# Patient Record
Sex: FEMALE | Race: WHITE | NOT HISPANIC OR LATINO
[De-identification: names, ages, dates, MRNs, and addresses within clinical notes are randomized per-mention and may not be internally consistent; named-entity substitution may affect disease eponyms.]

---

## 2019-11-20 ENCOUNTER — APPOINTMENT (OUTPATIENT)
Dept: OPHTHALMOLOGY | Facility: CLINIC | Age: 64
End: 2019-11-20
Payer: COMMERCIAL

## 2019-11-20 ENCOUNTER — NON-APPOINTMENT (OUTPATIENT)
Age: 64
End: 2019-11-20

## 2019-11-20 PROBLEM — Z00.00 ENCOUNTER FOR PREVENTIVE HEALTH EXAMINATION: Status: ACTIVE | Noted: 2019-11-20

## 2019-11-20 PROCEDURE — 92004 COMPRE OPH EXAM NEW PT 1/>: CPT

## 2019-11-27 ENCOUNTER — APPOINTMENT (OUTPATIENT)
Dept: OPHTHALMOLOGY | Facility: CLINIC | Age: 64
End: 2019-11-27

## 2020-02-21 ENCOUNTER — APPOINTMENT (OUTPATIENT)
Dept: GASTROENTEROLOGY | Facility: CLINIC | Age: 65
End: 2020-02-21
Payer: COMMERCIAL

## 2020-02-21 VITALS
HEART RATE: 85 BPM | DIASTOLIC BLOOD PRESSURE: 88 MMHG | HEIGHT: 62 IN | BODY MASS INDEX: 32.02 KG/M2 | OXYGEN SATURATION: 98 % | WEIGHT: 174 LBS | SYSTOLIC BLOOD PRESSURE: 152 MMHG

## 2020-02-21 DIAGNOSIS — Z87.891 PERSONAL HISTORY OF NICOTINE DEPENDENCE: ICD-10-CM

## 2020-02-21 DIAGNOSIS — Z78.9 OTHER SPECIFIED HEALTH STATUS: ICD-10-CM

## 2020-02-21 DIAGNOSIS — K92.1 MELENA: ICD-10-CM

## 2020-02-21 DIAGNOSIS — Z83.79 FAMILY HISTORY OF OTHER DISEASES OF THE DIGESTIVE SYSTEM: ICD-10-CM

## 2020-02-21 PROCEDURE — 99204 OFFICE O/P NEW MOD 45 MIN: CPT

## 2020-02-21 NOTE — PHYSICAL EXAM
[General Appearance - Alert] : alert [Sclera] : the sclera and conjunctiva were normal [General Appearance - In No Acute Distress] : in no acute distress [Extraocular Movements] : extraocular movements were intact [Outer Ear] : the ears and nose were normal in appearance [Neck Appearance] : the appearance of the neck was normal [Hearing Threshold Finger Rub Not [place]] : hearing was normal [Neck Cervical Mass (___cm)] : no neck mass was observed [Heart Rate And Rhythm] : heart rate was normal and rhythm regular [Heart Sounds] : normal S1 and S2 [Auscultation Breath Sounds / Voice Sounds] : lungs were clear to auscultation bilaterally [Heart Sounds Gallop] : no gallops [Murmurs] : no murmurs [Bowel Sounds] : normal bowel sounds [Heart Sounds Pericardial Friction Rub] : no pericardial rub [Abdomen Soft] : soft [Abdomen Tenderness] : non-tender [Abdomen Mass (___ Cm)] : no abdominal mass palpated [Cervical Lymph Nodes Enlarged Anterior Bilaterally] : anterior cervical [Cervical Lymph Nodes Enlarged Posterior Bilaterally] : posterior cervical [Abnormal Walk] : normal gait [Nail Clubbing] : no clubbing  or cyanosis of the fingernails [Supraclavicular Lymph Nodes Enlarged Bilaterally] : supraclavicular [] : no rash [Skin Color & Pigmentation] : normal skin color and pigmentation [Oriented To Time, Place, And Person] : oriented to person, place, and time [Impaired Insight] : insight and judgment were intact [Affect] : the affect was normal

## 2020-02-21 NOTE — ASSESSMENT
[FreeTextEntry1] : 64-year-old woman with history of gallbladder removal in 2015, family hx of ulcerative colitis, who presents with complaints of irregular bowel habits, blood in stools.  She has never had a colonoscopy. \par \par I will therefore plan for a colonoscopy to rule out colitis, colon polyps, colorectal cancer etc. under monitored anesthesia care.  Risks such as perforation requiring surgery, bleeding, infection, diverticulitis, colitis, missed colon cancer (2% to 6%), internal organ injury, etc, risks of bowel prep including colitis, syncope, adverse reaction to medication etc. and risks of anesthesia including cardiopulmonary compromise were discussed with patient.  Patient verbalized understanding and agrees to proceed with the planned procedure.\par \par Since she's had history of long-term acid reflux we discussed the possibility of doing an endoscopy to rule out Chambers's esophagus etc. the patient wants to hold off.\par \par She has been taking long-term omeprazole we discussed the possibility of stopping it and trying alternatives such as H2 blockers - I have recommended Pepcid she will give this a try.\par \par She was recommended to take Metamucil once a day to regulate her stools leading up to her colonoscopy.

## 2020-02-21 NOTE — HISTORY OF PRESENT ILLNESS
[de-identified] : 64-year-old woman with history of gallbladder removal in 2015, who presents with complaints of diarrhea.\par \par The patient reports that her bowel habits have been irregular for many years however it's been getting a little more worse lately.\par \par She says at the end of January, she had abdominal cramping for one day associated with red blood in her stool. She has also seen pinkish blood in her stool.  At times she can see mucus in her stool.\par \par She says on average she has 3 bowel movements a day, her stools can be soft or watery.  At times she feels constipated. The constipation can alternate with the diarrhea.\par \par She denies any other symptoms such as nausea, vomiting.  She denies loss of appetite, abnormal weight loss.  She denies any black blood in her stool.\par \par She says she's been taking omeprazole once a day for 4 years which controls her heartburn. When she stops taking the omeprazole she has heartburn.\par \par She denies any trouble swallowing, pain upon swallowing food.\par \par Her sister was diagnosed with ulcerative colitis. A grandparent may have had liver cancer.  An aunt had pancreatic cancer in her 70s.\par \par She denies having any abdominal pain. The cramping was just at one time event at the end of January.\par \par All other review of systems are negative.  Denies cardiac symptoms.\par

## 2020-08-06 DIAGNOSIS — R19.8 OTHER SPECIFIED SYMPTOMS AND SIGNS INVOLVING THE DIGESTIVE SYSTEM AND ABDOMEN: ICD-10-CM

## 2020-08-12 DIAGNOSIS — Z01.818 ENCOUNTER FOR OTHER PREPROCEDURAL EXAMINATION: ICD-10-CM

## 2020-08-15 ENCOUNTER — APPOINTMENT (OUTPATIENT)
Dept: DISASTER EMERGENCY | Facility: CLINIC | Age: 65
End: 2020-08-15

## 2020-08-16 LAB — SARS-COV-2 N GENE NPH QL NAA+PROBE: NOT DETECTED

## 2020-08-17 ENCOUNTER — TRANSCRIPTION ENCOUNTER (OUTPATIENT)
Age: 65
End: 2020-08-17

## 2020-08-18 ENCOUNTER — RESULT REVIEW (OUTPATIENT)
Age: 65
End: 2020-08-18

## 2020-08-18 ENCOUNTER — APPOINTMENT (OUTPATIENT)
Dept: GASTROENTEROLOGY | Facility: HOSPITAL | Age: 65
End: 2020-08-18

## 2020-08-18 ENCOUNTER — OUTPATIENT (OUTPATIENT)
Dept: OUTPATIENT SERVICES | Facility: HOSPITAL | Age: 65
LOS: 1 days | End: 2020-08-18
Payer: COMMERCIAL

## 2020-08-18 DIAGNOSIS — Z98.89 OTHER SPECIFIED POSTPROCEDURAL STATES: Chronic | ICD-10-CM

## 2020-08-18 DIAGNOSIS — K08.409 PARTIAL LOSS OF TEETH, UNSPECIFIED CAUSE, UNSPECIFIED CLASS: Chronic | ICD-10-CM

## 2020-08-18 DIAGNOSIS — Z12.11 ENCOUNTER FOR SCREENING FOR MALIGNANT NEOPLASM OF COLON: ICD-10-CM

## 2020-08-18 PROCEDURE — 88305 TISSUE EXAM BY PATHOLOGIST: CPT | Mod: 26

## 2020-08-18 PROCEDURE — 45380 COLONOSCOPY AND BIOPSY: CPT

## 2020-08-18 PROCEDURE — 88305 TISSUE EXAM BY PATHOLOGIST: CPT

## 2020-09-08 ENCOUNTER — APPOINTMENT (OUTPATIENT)
Dept: GASTROENTEROLOGY | Facility: CLINIC | Age: 65
End: 2020-09-08
Payer: COMMERCIAL

## 2020-09-08 PROCEDURE — 99449 NTRPROF PH1/NTRNET/EHR 31/>: CPT

## 2020-09-16 ENCOUNTER — NON-APPOINTMENT (OUTPATIENT)
Age: 65
End: 2020-09-16

## 2020-09-16 ENCOUNTER — APPOINTMENT (OUTPATIENT)
Dept: OPHTHALMOLOGY | Facility: CLINIC | Age: 65
End: 2020-09-16
Payer: COMMERCIAL

## 2020-09-16 PROCEDURE — 92014 COMPRE OPH EXAM EST PT 1/>: CPT

## 2020-09-16 PROCEDURE — 92015 DETERMINE REFRACTIVE STATE: CPT

## 2020-09-21 ENCOUNTER — APPOINTMENT (OUTPATIENT)
Dept: GASTROENTEROLOGY | Facility: CLINIC | Age: 65
End: 2020-09-21

## 2020-09-23 ENCOUNTER — LABORATORY RESULT (OUTPATIENT)
Age: 65
End: 2020-09-23

## 2020-09-23 LAB
25(OH)D3 SERPL-MCNC: 32.2 NG/ML
ALBUMIN SERPL ELPH-MCNC: 3.9 G/DL
ALP BLD-CCNC: 100 U/L
ALT SERPL-CCNC: 16 U/L
ANION GAP SERPL CALC-SCNC: 12 MMOL/L
AST SERPL-CCNC: 15 U/L
BASOPHILS # BLD AUTO: 0.04 K/UL
BASOPHILS NFR BLD AUTO: 0.4 %
BILIRUB SERPL-MCNC: 0.5 MG/DL
BUN SERPL-MCNC: 8 MG/DL
CALCIUM SERPL-MCNC: 9 MG/DL
CHLORIDE SERPL-SCNC: 107 MMOL/L
CO2 SERPL-SCNC: 23 MMOL/L
CREAT SERPL-MCNC: 0.9 MG/DL
CRP SERPL-MCNC: 6.62 MG/DL
EOSINOPHIL # BLD AUTO: 0.85 K/UL
EOSINOPHIL NFR BLD AUTO: 8.5 %
FERRITIN SERPL-MCNC: 57 NG/ML
GLUCOSE SERPL-MCNC: 118 MG/DL
HAV IGM SER QL: NONREACTIVE
HBV CORE IGM SER QL: NONREACTIVE
HBV SURFACE AB SER QL: NONREACTIVE
HBV SURFACE AG SER QL: NONREACTIVE
HCT VFR BLD CALC: 43.1 %
HEPATITIS A IGG ANTIBODY: NONREACTIVE
HGB BLD-MCNC: 13 G/DL
IMM GRANULOCYTES NFR BLD AUTO: 0.4 %
IRON SATN MFR SERPL: 7 %
IRON SERPL-MCNC: 19 UG/DL
LYMPHOCYTES # BLD AUTO: 1.65 K/UL
LYMPHOCYTES NFR BLD AUTO: 16.5 %
MAN DIFF?: NORMAL
MCHC RBC-ENTMCNC: 24.3 PG
MCHC RBC-ENTMCNC: 30.2 GM/DL
MCV RBC AUTO: 80.6 FL
MONOCYTES # BLD AUTO: 0.74 K/UL
MONOCYTES NFR BLD AUTO: 7.4 %
NEUTROPHILS # BLD AUTO: 6.68 K/UL
NEUTROPHILS NFR BLD AUTO: 66.8 %
PLATELET # BLD AUTO: 252 K/UL
POTASSIUM SERPL-SCNC: 3.7 MMOL/L
PROT SERPL-MCNC: 7.1 G/DL
RBC # BLD: 5.35 M/UL
RBC # FLD: 18.9 %
SODIUM SERPL-SCNC: 142 MMOL/L
TIBC SERPL-MCNC: 285 UG/DL
UIBC SERPL-MCNC: 266 UG/DL
VIT B12 SERPL-MCNC: 638 PG/ML
WBC # FLD AUTO: 10 K/UL

## 2020-09-24 LAB
C DIFF TOX GENS STL QL NAA+PROBE: NORMAL
CDIFF BY PCR: NOT DETECTED
G LAMBLIA AG STL QL: NORMAL
M TB IFN-G BLD-IMP: NEGATIVE
QUANTIFERON TB PLUS MITOGEN MINUS NIL: 2.23 IU/ML
QUANTIFERON TB PLUS NIL: 0.03 IU/ML
QUANTIFERON TB PLUS TB1 MINUS NIL: 0 IU/ML
QUANTIFERON TB PLUS TB2 MINUS NIL: 0 IU/ML
WRIGHT STN STL: NEGATIVE

## 2020-09-25 LAB
BACTERIA STL CULT: NORMAL
DEPRECATED O AND P PREP STL: NORMAL

## 2020-10-12 ENCOUNTER — APPOINTMENT (OUTPATIENT)
Dept: GASTROENTEROLOGY | Facility: CLINIC | Age: 65
End: 2020-10-12
Payer: MEDICARE

## 2020-10-12 VITALS
HEIGHT: 62 IN | HEART RATE: 54 BPM | SYSTOLIC BLOOD PRESSURE: 127 MMHG | WEIGHT: 156 LBS | OXYGEN SATURATION: 99 % | BODY MASS INDEX: 28.71 KG/M2 | DIASTOLIC BLOOD PRESSURE: 84 MMHG

## 2020-10-12 PROCEDURE — 99213 OFFICE O/P EST LOW 20 MIN: CPT

## 2020-10-12 NOTE — PHYSICAL EXAM
[General Appearance - Alert] : alert [General Appearance - In No Acute Distress] : in no acute distress [Sclera] : the sclera and conjunctiva were normal [Extraocular Movements] : extraocular movements were intact [Outer Ear] : the ears and nose were normal in appearance [Hearing Threshold Finger Rub Not [place]] : hearing was normal [Neck Appearance] : the appearance of the neck was normal [Neck Cervical Mass (___cm)] : no neck mass was observed [Auscultation Breath Sounds / Voice Sounds] : lungs were clear to auscultation bilaterally [Heart Rate And Rhythm] : heart rate was normal and rhythm regular [Heart Sounds] : normal S1 and S2 [Heart Sounds Gallop] : no gallops [Murmurs] : no murmurs [Heart Sounds Pericardial Friction Rub] : no pericardial rub [Bowel Sounds] : normal bowel sounds [Abdomen Soft] : soft [Abdomen Tenderness] : non-tender [Abdomen Mass (___ Cm)] : no abdominal mass palpated [Cervical Lymph Nodes Enlarged Posterior Bilaterally] : posterior cervical [Cervical Lymph Nodes Enlarged Anterior Bilaterally] : anterior cervical [Supraclavicular Lymph Nodes Enlarged Bilaterally] : supraclavicular [Abnormal Walk] : normal gait [Nail Clubbing] : no clubbing  or cyanosis of the fingernails [Skin Color & Pigmentation] : normal skin color and pigmentation [] : no rash [Oriented To Time, Place, And Person] : oriented to person, place, and time [Impaired Insight] : insight and judgment were intact [Affect] : the affect was normal

## 2020-10-12 NOTE — ASSESSMENT
[FreeTextEntry1] : IMPRESSION: \par 1.  Ulcerative Colitis mainly left sided - symptoms improved with oral Budensonide  \par 2.  Stool calprotectin - 1356\par \par \par PLAN\par 1.  Continue Budesonide 9 mg once a day\par 2.  Continue Oral Mesalamine 4 capsules a day\par 3.  Would need to be Up to date on vaccines - hep A and B vaccines\par 4.  Yearly flu shot\par 5.  Follow up in 6 weeks.  \par \par

## 2020-10-12 NOTE — HISTORY OF PRESENT ILLNESS
[de-identified] : 64-year-old woman with history of gallbladder removal in 2015, who present for follow up of ulcerative colitis.  \par \par She has been feeling much better.  Has been having 3 or 4 bowel movements a day.  Mainly stools are loose.  Still wakes up in the morning at 6 to have a bowel movement.   No melena and hematochezia.   \par \par No abdominal pain or abdominal cramps. \par Eats almost three meals a day.  No nausea and vomiting. \par \par Says she feels a new person.  3 weeks ago very bad diarrhea, urgency, and fecal incontinence - now all better since being steroids. \par \par Has not fevers.  \par \par Has been taking Budesonide since past week.  Has been taking antibiotics since past week. On oral Mesalamine.   \par \par \par Stool calprotectin - 1356\par C-d iff negative \par \par \par \par \par Feb 2020 visit: \par The patient reports that her bowel habits have been irregular for many years however it's been getting a little more worse lately.\par \par She says at the end of January, she had abdominal cramping for one day associated with red blood in her stool. She has also seen pinkish blood in her stool. At times she can see mucus in her stool.\par \par She says on average she has 3 bowel movements a day, her stools can be soft or watery. At times she feels constipated. The constipation can alternate with the diarrhea.\par \par She denies any other symptoms such as nausea, vomiting. She denies loss of appetite, abnormal weight loss. She denies any black blood in her stool.\par \par She says she's been taking omeprazole once a day for 4 years which controls her heartburn. When she stops taking the omeprazole she has heartburn.\par \par She denies any trouble swallowing, pain upon swallowing food.\par \par Her sister was diagnosed with ulcerative colitis. A grandparent may have had liver cancer. An aunt had pancreatic cancer in her 70s.\par \par She denies having any abdominal pain. The cramping was just at one time event at the end of January.\par \par \par Colonoscopy in Aug 2020: showed colonic erythema from descending colon and into the rectum - random biopsies were obtained from cecum to rectum.  Cecum bx showed mild colitis with focal cryptitis, ascending colon bx showed mild colitis, transverse colon bx showed mild colitis with mild crypt architectural distortion, descending colon bx showed mild colitis with mild crypt architectural distortion, sigmoid colon bx showed moderately active chronic colitis with focal crypt abscess, and crypt distortion, rectal bx showed moderate to severely active chronic proctitis with chronic changes including cryptitis foci of crypt abscess, surface erosion extensive crypt architectural distortion.  No dysplasia on biopsies.  \par \par \par Sept 2020:\par Reports having lower abdominal cramps. Some days very "bad cramps" - today is a "good day." Some days diarrhea - some days a little bit formed. Had one bowel movement today. Stools are loose mainly. On a bad day has 4 to 7 bowel movements a day - on a good day 3 BMs. Has urgency at times. Wakes up at night, once to have diarrhea. \par \par \par Discussion/Summary Sept 2020: \par Has been having 6 or 7 loose stools a day - no melena, no hematochezia. During the day has no abdominal pain. Had fever of 102 few days ago did not notify PCP, or our office- last night had a temp 100. No fever today. \par \par Has been feeling "tired, drained, loss of appetite."\par \par Her sister was hospitalized twice \par \par Recent CRP of 6.62, recent stool tests negative. \par \par \par Discussion/plan:\par 1. I have recommended her to be on steroids - she would like to hold off on prednisone she is agreeable to Budesonide 9 mg once a day \par 2. Cipro and Flagyl \par 3. Continue mesalamine \par 4. Encouraged hydration - Pedialyte\par 5. Follow up in office. \par 6. Go to ER if persistent fevers, worsening symptoms

## 2020-11-08 ENCOUNTER — RX RENEWAL (OUTPATIENT)
Age: 65
End: 2020-11-08

## 2020-12-03 ENCOUNTER — RX RENEWAL (OUTPATIENT)
Age: 65
End: 2020-12-03

## 2020-12-07 ENCOUNTER — APPOINTMENT (OUTPATIENT)
Dept: GASTROENTEROLOGY | Facility: CLINIC | Age: 65
End: 2020-12-07
Payer: MEDICARE

## 2020-12-07 VITALS
OXYGEN SATURATION: 95 % | DIASTOLIC BLOOD PRESSURE: 95 MMHG | HEIGHT: 62 IN | SYSTOLIC BLOOD PRESSURE: 155 MMHG | WEIGHT: 155 LBS | HEART RATE: 98 BPM | BODY MASS INDEX: 28.52 KG/M2

## 2020-12-07 PROCEDURE — 99072 ADDL SUPL MATRL&STAF TM PHE: CPT

## 2020-12-07 PROCEDURE — 99214 OFFICE O/P EST MOD 30 MIN: CPT

## 2020-12-07 NOTE — PHYSICAL EXAM
[General Appearance - Alert] : alert [General Appearance - In No Acute Distress] : in no acute distress [Sclera] : the sclera and conjunctiva were normal [Extraocular Movements] : extraocular movements were intact [Outer Ear] : the ears and nose were normal in appearance [Hearing Threshold Finger Rub Not [place]] : hearing was normal [Neck Appearance] : the appearance of the neck was normal [Neck Cervical Mass (___cm)] : no neck mass was observed [Auscultation Breath Sounds / Voice Sounds] : lungs were clear to auscultation bilaterally [Heart Rate And Rhythm] : heart rate was normal and rhythm regular [Heart Sounds] : normal S1 and S2 [Heart Sounds Gallop] : no gallops [Murmurs] : no murmurs [Heart Sounds Pericardial Friction Rub] : no pericardial rub [Bowel Sounds] : normal bowel sounds [Abdomen Soft] : soft [Abdomen Tenderness] : non-tender [] : no hepato-splenomegaly [Abdomen Mass (___ Cm)] : no abdominal mass palpated [Cervical Lymph Nodes Enlarged Posterior Bilaterally] : posterior cervical [Cervical Lymph Nodes Enlarged Anterior Bilaterally] : anterior cervical [Supraclavicular Lymph Nodes Enlarged Bilaterally] : supraclavicular [Abnormal Walk] : normal gait [Skin Color & Pigmentation] : normal skin color and pigmentation [Oriented To Time, Place, And Person] : oriented to person, place, and time [Impaired Insight] : insight and judgment were intact [Affect] : the affect was normal

## 2020-12-08 NOTE — ASSESSMENT
[FreeTextEntry1] : IMPRESSION: \par 1.  Ulcerative Colitis mainly left sided - finishing course of steroids - tomorrow last day.  \par 2.  Maintenance therapy - Lialda \par 3.  Stool calprotectin in Sept 2020- 1356 \par \par \par PLAN\par 1.  Blood test and stool test for biomarkers\par 2.  Continue Lialda \par 3.  Add VSL #3\par 4.  Would need to be Up to date on vaccines - hep A and B vaccines\par 5.  Yearly flu shot

## 2020-12-08 NOTE — HISTORY OF PRESENT ILLNESS
[de-identified] : 65-year-old woman with history of gallbladder removal in 2015, who present for follow up of ulcerative colitis on Lialda.  \par \par Feels much better.  \par Has one or two bowel movements a day - 85 to 90% formed.  Still wakes up between 4:30 to 5:30 AM to have a bowel movement since being diagnosed with ulcerative colitis. Tomorrow last day of prednisone nimisha.  \par \par Once in a while she will have abdominal cramps after a bowel movement - twice a week. \par \par No melena and no hematochezia. \par \par No fevers, no chills.  \par \par Still feels weak.  Had oral sores in Oct but then resolves towards end of October.  Denies other extraintestinal manifestation.  \par \par \par \par \par \par \par Initial visit in Feb 2020: \par The patient reports that her bowel habits have been irregular for many years however it's been getting a little more worse lately.\par \par She says at the end of January, she had abdominal cramping for one day associated with red blood in her stool. She has also seen pinkish blood in her stool. At times she can see mucus in her stool.\par \par She says on average she has 3 bowel movements a day, her stools can be soft or watery. At times she feels constipated. The constipation can alternate with the diarrhea.\par \par She denies any other symptoms such as nausea, vomiting. She denies loss of appetite, abnormal weight loss. She denies any black blood in her stool.\par \par She says she's been taking omeprazole once a day for 4 years which controls her heartburn. When she stops taking the omeprazole she has heartburn.\par \par She denies any trouble swallowing, pain upon swallowing food.\par \par Her sister was diagnosed with ulcerative colitis. A grandparent may have had liver cancer. An aunt had pancreatic cancer in her 70s.\par \par She denies having any abdominal pain. The cramping was just at one time event at the end of January.\par \par \par Colonoscopy in Aug 2020: showed colonic erythema from descending colon and into the rectum - random biopsies were obtained from cecum to rectum. Cecum bx showed mild colitis with focal cryptitis, ascending colon bx showed mild colitis, transverse colon bx showed mild colitis with mild crypt architectural distortion, descending colon bx showed mild colitis with mild crypt architectural distortion, sigmoid colon bx showed moderately active chronic colitis with focal crypt abscess, and crypt distortion, rectal bx showed moderate to severely active chronic proctitis with chronic changes including cryptitis foci of crypt abscess, surface erosion extensive crypt architectural distortion. No dysplasia on biopsies. \par \par \par Sept 2020:\par Reports having lower abdominal cramps. Some days very "bad cramps" - today is a "good day." Some days diarrhea - some days a little bit formed. Had one bowel movement today. Stools are loose mainly. On a bad day has 4 to 7 bowel movements a day - on a good day 3 BMs. Has urgency at times. Wakes up at night, once to have diarrhea. \par \par \par Discussion/Summary Sept 2020: \par Has been having 6 or 7 loose stools a day - no melena, no hematochezia. During the day has no abdominal pain. Had fever of 102 few days ago did not notify PCP, or our office- last night had a temp 100. No fever today.  Budesonide started along with antibiotics. \par \par \par  \par \par \par Oct 12, 2020 visit: \par She has been feeling much better. Has been having 3 or 4 bowel movements a day. Mainly stools are loose. Still wakes up in the morning at 6 to have a bowel movement. No melena and hematochezia. \par \par No abdominal pain or abdominal cramps. \par Eats almost three meals a day. No nausea and vomiting. \par \par Says she feels a new person. 3 weeks ago very bad diarrhea, urgency, and fecal incontinence - now all better since being steroids. \par \par Has not fevers. \par \par Has been taking Budesonide since past week. Has been taking antibiotics since past week. On oral Mesalamine. \par \par \par Stool calprotectin - 1356\par C-d iff negative \par \par

## 2020-12-21 ENCOUNTER — RX RENEWAL (OUTPATIENT)
Age: 65
End: 2020-12-21

## 2020-12-29 ENCOUNTER — NON-APPOINTMENT (OUTPATIENT)
Age: 65
End: 2020-12-29

## 2020-12-29 LAB
ALBUMIN SERPL ELPH-MCNC: 4 G/DL
ALP BLD-CCNC: 99 U/L
ALT SERPL-CCNC: 12 U/L
ANION GAP SERPL CALC-SCNC: 9 MMOL/L
AST SERPL-CCNC: 16 U/L
BASOPHILS # BLD AUTO: 0.03 K/UL
BASOPHILS NFR BLD AUTO: 0.4 %
BILIRUB SERPL-MCNC: 0.4 MG/DL
BUN SERPL-MCNC: 4 MG/DL
CALCIUM SERPL-MCNC: 9.3 MG/DL
CHLORIDE SERPL-SCNC: 105 MMOL/L
CO2 SERPL-SCNC: 29 MMOL/L
CREAT SERPL-MCNC: 0.9 MG/DL
CRP SERPL-MCNC: 2.27 MG/DL
EOSINOPHIL # BLD AUTO: 0.19 K/UL
EOSINOPHIL NFR BLD AUTO: 2.3 %
GLUCOSE SERPL-MCNC: 123 MG/DL
HCT VFR BLD CALC: 39.7 %
HGB BLD-MCNC: 12.1 G/DL
IMM GRANULOCYTES NFR BLD AUTO: 0.2 %
LYMPHOCYTES # BLD AUTO: 2.1 K/UL
LYMPHOCYTES NFR BLD AUTO: 25.7 %
MAN DIFF?: NORMAL
MCHC RBC-ENTMCNC: 24.1 PG
MCHC RBC-ENTMCNC: 30.5 GM/DL
MCV RBC AUTO: 78.9 FL
MONOCYTES # BLD AUTO: 0.46 K/UL
MONOCYTES NFR BLD AUTO: 5.6 %
NEUTROPHILS # BLD AUTO: 5.37 K/UL
NEUTROPHILS NFR BLD AUTO: 65.8 %
PLATELET # BLD AUTO: 271 K/UL
POTASSIUM SERPL-SCNC: 3.1 MMOL/L
PROT SERPL-MCNC: 6.9 G/DL
RBC # BLD: 5.03 M/UL
RBC # FLD: 17.4 %
SODIUM SERPL-SCNC: 142 MMOL/L
WBC # FLD AUTO: 8.17 K/UL

## 2021-01-03 ENCOUNTER — RX RENEWAL (OUTPATIENT)
Age: 66
End: 2021-01-03

## 2021-01-03 LAB — CALPROTECTIN FECAL: 403 UG/G

## 2021-01-19 ENCOUNTER — NON-APPOINTMENT (OUTPATIENT)
Age: 66
End: 2021-01-19

## 2021-01-20 ENCOUNTER — APPOINTMENT (OUTPATIENT)
Dept: OPHTHALMOLOGY | Facility: CLINIC | Age: 66
End: 2021-01-20
Payer: MEDICARE

## 2021-01-20 ENCOUNTER — NON-APPOINTMENT (OUTPATIENT)
Age: 66
End: 2021-01-20

## 2021-01-20 PROCEDURE — 92012 INTRM OPH EXAM EST PATIENT: CPT

## 2021-01-20 PROCEDURE — 99072 ADDL SUPL MATRL&STAF TM PHE: CPT

## 2021-01-21 ENCOUNTER — TRANSCRIPTION ENCOUNTER (OUTPATIENT)
Age: 66
End: 2021-01-21

## 2021-01-21 ENCOUNTER — NON-APPOINTMENT (OUTPATIENT)
Age: 66
End: 2021-01-21

## 2021-01-26 ENCOUNTER — APPOINTMENT (OUTPATIENT)
Dept: OPHTHALMOLOGY | Facility: CLINIC | Age: 66
End: 2021-01-26
Payer: MEDICARE

## 2021-01-26 ENCOUNTER — NON-APPOINTMENT (OUTPATIENT)
Age: 66
End: 2021-01-26

## 2021-01-26 PROCEDURE — 92012 INTRM OPH EXAM EST PATIENT: CPT

## 2021-01-26 PROCEDURE — 99072 ADDL SUPL MATRL&STAF TM PHE: CPT

## 2021-01-28 ENCOUNTER — RX RENEWAL (OUTPATIENT)
Age: 66
End: 2021-01-28

## 2021-02-05 ENCOUNTER — APPOINTMENT (OUTPATIENT)
Dept: GASTROENTEROLOGY | Facility: CLINIC | Age: 66
End: 2021-02-05

## 2021-02-09 ENCOUNTER — APPOINTMENT (OUTPATIENT)
Dept: OPHTHALMOLOGY | Facility: CLINIC | Age: 66
End: 2021-02-09
Payer: MEDICARE

## 2021-02-09 ENCOUNTER — NON-APPOINTMENT (OUTPATIENT)
Age: 66
End: 2021-02-09

## 2021-02-09 PROCEDURE — 92012 INTRM OPH EXAM EST PATIENT: CPT

## 2021-02-09 PROCEDURE — 99072 ADDL SUPL MATRL&STAF TM PHE: CPT

## 2021-02-16 ENCOUNTER — RX RENEWAL (OUTPATIENT)
Age: 66
End: 2021-02-16

## 2021-02-19 ENCOUNTER — APPOINTMENT (OUTPATIENT)
Dept: GASTROENTEROLOGY | Facility: CLINIC | Age: 66
End: 2021-02-19
Payer: MEDICARE

## 2021-02-19 VITALS
HEIGHT: 62 IN | WEIGHT: 156.5 LBS | SYSTOLIC BLOOD PRESSURE: 110 MMHG | HEART RATE: 90 BPM | TEMPERATURE: 97 F | OXYGEN SATURATION: 98 % | DIASTOLIC BLOOD PRESSURE: 80 MMHG | BODY MASS INDEX: 28.8 KG/M2

## 2021-02-19 PROCEDURE — 99214 OFFICE O/P EST MOD 30 MIN: CPT

## 2021-02-19 PROCEDURE — 99072 ADDL SUPL MATRL&STAF TM PHE: CPT

## 2021-02-19 RX ORDER — PREDNISOLONE ACETATE 10 MG/ML
1 SUSPENSION/ DROPS OPHTHALMIC
Refills: 0 | Status: ACTIVE | COMMUNITY

## 2021-02-19 RX ORDER — SACCHAROMYCES BOULARDII 50 MG
250 CAPSULE ORAL
Refills: 0 | Status: ACTIVE | COMMUNITY

## 2021-02-19 RX ORDER — VIT C/E/ZN/COPPR/LUTEIN/ZEAXAN 250MG-90MG
CAPSULE ORAL
Refills: 0 | Status: ACTIVE | COMMUNITY

## 2021-02-19 RX ORDER — LACTOBACIL 2/BIFIDO 1/S.THERMO 900B CELL
PACKET (EA) ORAL
Qty: 90 | Refills: 3 | Status: DISCONTINUED | COMMUNITY
Start: 2020-12-07 | End: 2021-02-19

## 2021-02-19 NOTE — PHYSICAL EXAM
[General Appearance - Alert] : alert [General Appearance - In No Acute Distress] : in no acute distress [Sclera] : the sclera and conjunctiva were normal [PERRL With Normal Accommodation] : pupils were equal in size, round, and reactive to light [Extraocular Movements] : extraocular movements were intact [Outer Ear] : the ears and nose were normal in appearance [Oropharynx] : the oropharynx was normal [Neck Appearance] : the appearance of the neck was normal [Neck Cervical Mass (___cm)] : no neck mass was observed [Jugular Venous Distention Increased] : there was no jugular-venous distention [Thyroid Diffuse Enlargement] : the thyroid was not enlarged [Thyroid Nodule] : there were no palpable thyroid nodules [Auscultation Breath Sounds / Voice Sounds] : lungs were clear to auscultation bilaterally [Heart Rate And Rhythm] : heart rate was normal and rhythm regular [Heart Sounds] : normal S1 and S2 [Heart Sounds Gallop] : no gallops [Murmurs] : no murmurs [Heart Sounds Pericardial Friction Rub] : no pericardial rub [Edema] : there was no peripheral edema [Bowel Sounds] : normal bowel sounds [Abdomen Soft] : soft [Abdomen Tenderness] : non-tender [Abdomen Mass (___ Cm)] : no abdominal mass palpated [Cervical Lymph Nodes Enlarged Posterior Bilaterally] : posterior cervical [Cervical Lymph Nodes Enlarged Anterior Bilaterally] : anterior cervical [Supraclavicular Lymph Nodes Enlarged Bilaterally] : supraclavicular [Axillary Lymph Nodes Enlarged Bilaterally] : axillary [Inguinal Lymph Nodes Enlarged Bilaterally] : inguinal [Femoral Lymph Nodes Enlarged Bilaterally] : femoral [No CVA Tenderness] : no ~M costovertebral angle tenderness [No Spinal Tenderness] : no spinal tenderness [Abnormal Walk] : normal gait [Nail Clubbing] : no clubbing  or cyanosis of the fingernails [Musculoskeletal - Swelling] : no joint swelling seen [Motor Tone] : muscle strength and tone were normal [Skin Color & Pigmentation] : normal skin color and pigmentation [Skin Turgor] : normal skin turgor [] : no rash [Oriented To Time, Place, And Person] : oriented to person, place, and time [Impaired Insight] : insight and judgment were intact [Affect] : the affect was normal

## 2021-02-19 NOTE — HISTORY OF PRESENT ILLNESS
[de-identified] : 65-year-old female, recent diagnosis of ulcerative colitis\par Pan ulcerative colitis by biopsies performed at the time of her colonoscopy in August\par In the fall patient developed her first true severe flare of ulcerative colitis as many as 10 bowel movements a day, incontinence, loss of appetite, weight loss of almost 30 pounds\par Patient responded well to prednisone, taper, switch to mesalamine currently using 1.2 g formulation, 4 capsules daily\par Patient feels well at this time\par 2 formed bowel movements a day without bleeding\par Has regained some of the weight\par \par Patient reports that around the time of her colitis diagnosis presented to ophthalmology with red painful eye\par Unsure of exact name of her diagnosis, but currently on corticosteroid drops, markedly improved\par \par Patient denies arthritis complaints, though reports that around the time her colitis flared she experienced oral ulcers, difficulty wearing her dentures\par \par Patient quit smoking 6 years ago\par \par Sister has ulcerative colitis

## 2021-02-19 NOTE — ASSESSMENT
[FreeTextEntry1] : 65-year-old female\par Well-controlled ulcerative colitis now several months following completion of corticosteroid taper\par Improved inflammatory markers C-reactive protein and calprotectin both noted\par \par Plan\par Patient with questions regarding hair loss\par Explained that this is usually due to disease activity, typically improves with disease control\par Also sometimes related to low iron or zinc level\par Patient may take 1 time daily supplement \par if complaints worsen Would have evaluated by dermatology\par \par Patient also asked questions regarding dosing of the mesalamine\par Explained that the longer she goes following corticosteroid taper feeling well, the higher likelihood that the mesalamine is the reason for her continued response\par Recommend that she continue 4 capsules daily for now, with consideration to decreased to 2 or 3 in the spring at the time of her next follow-up\par \par No blood work indicated at this time\par No indication for repeat colonoscopy at this time\par \par Patient referred by Dr. Gabby Coy

## 2021-02-24 ENCOUNTER — RX RENEWAL (OUTPATIENT)
Age: 66
End: 2021-02-24

## 2021-03-16 ENCOUNTER — RX RENEWAL (OUTPATIENT)
Age: 66
End: 2021-03-16

## 2021-03-16 ENCOUNTER — APPOINTMENT (OUTPATIENT)
Dept: OPHTHALMOLOGY | Facility: CLINIC | Age: 66
End: 2021-03-16
Payer: MEDICARE

## 2021-03-16 ENCOUNTER — NON-APPOINTMENT (OUTPATIENT)
Age: 66
End: 2021-03-16

## 2021-03-16 PROCEDURE — 99072 ADDL SUPL MATRL&STAF TM PHE: CPT

## 2021-03-16 PROCEDURE — 92012 INTRM OPH EXAM EST PATIENT: CPT

## 2021-04-15 ENCOUNTER — RX RENEWAL (OUTPATIENT)
Age: 66
End: 2021-04-15

## 2021-05-20 ENCOUNTER — RX RENEWAL (OUTPATIENT)
Age: 66
End: 2021-05-20

## 2021-06-03 ENCOUNTER — APPOINTMENT (OUTPATIENT)
Dept: GASTROENTEROLOGY | Facility: CLINIC | Age: 66
End: 2021-06-03
Payer: MEDICARE

## 2021-06-03 VITALS
TEMPERATURE: 97.1 F | DIASTOLIC BLOOD PRESSURE: 80 MMHG | BODY MASS INDEX: 29.08 KG/M2 | HEIGHT: 62 IN | HEART RATE: 104 BPM | SYSTOLIC BLOOD PRESSURE: 130 MMHG | OXYGEN SATURATION: 96 % | WEIGHT: 158 LBS

## 2021-06-03 PROCEDURE — 99213 OFFICE O/P EST LOW 20 MIN: CPT

## 2021-06-03 NOTE — HISTORY OF PRESENT ILLNESS
[de-identified] : 65-year-old female, newly diagnosed ulcerative colitis\par Off prednisone since December\par Well maintained on mesalamine 4 tablets daily\par Denies any bleeding\par Complaints of gas in the morning\par Occasional loose stools, particularly after lactose-containing foods\par Denies any bleeding abdominal pain or weight loss\par Patient's red eye complaints have improved with steroid eyedrops\par Patient notes that her sister was recently diagnosed with episcleritis in addition to her own history of IBD\par \par Patient received her second Covid vaccination, moderna 2 weeks ago\par Mild fever followed, now resolved

## 2021-06-03 NOTE — ASSESSMENT
[FreeTextEntry1] : 65-year-old female ulcerative colitis, well controlled with mesalamine\par \par Plan\par Continue mesalamine 4 tablets daily\par Office visit 4 months\par Sooner as clinically indicated

## 2021-06-08 ENCOUNTER — APPOINTMENT (OUTPATIENT)
Dept: OPHTHALMOLOGY | Facility: CLINIC | Age: 66
End: 2021-06-08
Payer: MEDICARE

## 2021-06-08 ENCOUNTER — NON-APPOINTMENT (OUTPATIENT)
Age: 66
End: 2021-06-08

## 2021-06-08 PROCEDURE — 92012 INTRM OPH EXAM EST PATIENT: CPT

## 2021-08-03 ENCOUNTER — LABORATORY RESULT (OUTPATIENT)
Age: 66
End: 2021-08-03

## 2021-08-05 LAB
25(OH)D3 SERPL-MCNC: 26.1 NG/ML
ALBUMIN SERPL ELPH-MCNC: 3.8 G/DL
ALP BLD-CCNC: 127 U/L
ALT SERPL-CCNC: 6 U/L
ANION GAP SERPL CALC-SCNC: 14 MMOL/L
AST SERPL-CCNC: 12 U/L
BASOPHILS # BLD AUTO: 0.04 K/UL
BASOPHILS NFR BLD AUTO: 0.4 %
BILIRUB SERPL-MCNC: 0.4 MG/DL
BUN SERPL-MCNC: 6 MG/DL
CALCIUM SERPL-MCNC: 9.2 MG/DL
CHLORIDE SERPL-SCNC: 107 MMOL/L
CO2 SERPL-SCNC: 26 MMOL/L
CREAT SERPL-MCNC: 0.73 MG/DL
CRP SERPL-MCNC: 34 MG/L
EOSINOPHIL # BLD AUTO: 0.42 K/UL
EOSINOPHIL NFR BLD AUTO: 4.3 %
GI PCR PANEL, STOOL: ABNORMAL
GLUCOSE SERPL-MCNC: 119 MG/DL
HCT VFR BLD CALC: 39.9 %
HGB BLD-MCNC: 11.7 G/DL
IMM GRANULOCYTES NFR BLD AUTO: 0.3 %
LYMPHOCYTES # BLD AUTO: 2.41 K/UL
LYMPHOCYTES NFR BLD AUTO: 24.5 %
MAN DIFF?: NORMAL
MCHC RBC-ENTMCNC: 22.1 PG
MCHC RBC-ENTMCNC: 29.3 GM/DL
MCV RBC AUTO: 75.4 FL
MONOCYTES # BLD AUTO: 0.65 K/UL
MONOCYTES NFR BLD AUTO: 6.6 %
NEUTROPHILS # BLD AUTO: 6.29 K/UL
NEUTROPHILS NFR BLD AUTO: 63.9 %
PLATELET # BLD AUTO: 305 K/UL
POTASSIUM SERPL-SCNC: 3.7 MMOL/L
PROT SERPL-MCNC: 7.2 G/DL
RBC # BLD: 5.29 M/UL
RBC # FLD: 20.1 %
SODIUM SERPL-SCNC: 147 MMOL/L
VIT B12 SERPL-MCNC: 462 PG/ML
WBC # FLD AUTO: 9.84 K/UL

## 2021-08-09 LAB
BACTERIA STL CULT: NORMAL
G LAMBLIA AG STL QL: NORMAL

## 2021-08-10 LAB — CALPROTECTIN FECAL: 679 UG/G

## 2021-08-12 ENCOUNTER — NON-APPOINTMENT (OUTPATIENT)
Age: 66
End: 2021-08-12

## 2021-09-07 ENCOUNTER — NON-APPOINTMENT (OUTPATIENT)
Age: 66
End: 2021-09-07

## 2021-09-09 LAB
HBV CORE IGG+IGM SER QL: NONREACTIVE
HBV SURFACE AB SER QL: NONREACTIVE
HBV SURFACE AG SER QL: NONREACTIVE

## 2021-09-10 LAB
M TB IFN-G BLD-IMP: NEGATIVE
QUANTIFERON TB PLUS MITOGEN MINUS NIL: 0.79 IU/ML
QUANTIFERON TB PLUS NIL: 0.02 IU/ML
QUANTIFERON TB PLUS TB1 MINUS NIL: 0 IU/ML
QUANTIFERON TB PLUS TB2 MINUS NIL: 0 IU/ML

## 2021-09-24 ENCOUNTER — APPOINTMENT (OUTPATIENT)
Dept: GASTROENTEROLOGY | Facility: CLINIC | Age: 66
End: 2021-09-24
Payer: MEDICARE

## 2021-09-24 VITALS
WEIGHT: 153 LBS | TEMPERATURE: 98 F | HEIGHT: 62 IN | DIASTOLIC BLOOD PRESSURE: 80 MMHG | HEART RATE: 84 BPM | BODY MASS INDEX: 28.16 KG/M2 | OXYGEN SATURATION: 96 % | SYSTOLIC BLOOD PRESSURE: 125 MMHG

## 2021-09-24 DIAGNOSIS — H20.9 UNSPECIFIED IRIDOCYCLITIS: ICD-10-CM

## 2021-09-24 PROCEDURE — 99214 OFFICE O/P EST MOD 30 MIN: CPT

## 2021-09-24 NOTE — HISTORY OF PRESENT ILLNESS
[de-identified] : 65-year-old female, over 1 year diagnosis, new ulcerative colitis\par Completing second course of prednisone, down to 10 mg daily\par Bowel habit has normalized\par Patient flare associated with GI PCR confirmed E. coli in August\par \par \par Medical history notable also for uveitis

## 2021-09-24 NOTE — ASSESSMENT
[FreeTextEntry1] : Colitis improving with prednisone\par \par Plan\par Continue mesalamine\par Patient's insurance charging $150 a month, offering sulfasalazine as an alternative\par Patient very well informed regarding sulfasalazine, use for colitis, need for blood test monitoring etc.\par For now, patient wishes to continue with the once daily mesalamine formulation, despite the cost\par Patient to continue and complete prednisone taper\par Lengthy discussion with patient regarding options for escalation of therapy to biologic or immune drugs\par Recommended Entyvio specifically, though acknowledged that this would offer no benefit for her uveitis should this flareup again\par If it did, would need to consult as well with her ophthalmologist to coordinate care\par Reviewed other Biologics and immune medications including oral, small molecule formulations\par Patient to do more research, will contact me again if disease flares otherwise 3-month follow-up

## 2021-10-12 ENCOUNTER — NON-APPOINTMENT (OUTPATIENT)
Age: 66
End: 2021-10-12

## 2021-10-12 ENCOUNTER — APPOINTMENT (OUTPATIENT)
Dept: OPHTHALMOLOGY | Facility: CLINIC | Age: 66
End: 2021-10-12
Payer: MEDICARE

## 2021-10-12 PROCEDURE — 92012 INTRM OPH EXAM EST PATIENT: CPT

## 2021-11-15 ENCOUNTER — NON-APPOINTMENT (OUTPATIENT)
Age: 66
End: 2021-11-15

## 2022-01-03 ENCOUNTER — NON-APPOINTMENT (OUTPATIENT)
Age: 67
End: 2022-01-03

## 2022-01-06 ENCOUNTER — APPOINTMENT (OUTPATIENT)
Dept: GASTROENTEROLOGY | Facility: CLINIC | Age: 67
End: 2022-01-06

## 2022-01-10 ENCOUNTER — NON-APPOINTMENT (OUTPATIENT)
Age: 67
End: 2022-01-10

## 2022-01-13 ENCOUNTER — RX RENEWAL (OUTPATIENT)
Age: 67
End: 2022-01-13

## 2022-01-18 ENCOUNTER — NON-APPOINTMENT (OUTPATIENT)
Age: 67
End: 2022-01-18

## 2022-01-25 ENCOUNTER — NON-APPOINTMENT (OUTPATIENT)
Age: 67
End: 2022-01-25

## 2022-01-25 ENCOUNTER — APPOINTMENT (OUTPATIENT)
Dept: GASTROENTEROLOGY | Facility: CLINIC | Age: 67
End: 2022-01-25
Payer: MEDICARE

## 2022-01-25 VITALS
TEMPERATURE: 98.7 F | BODY MASS INDEX: 27.05 KG/M2 | HEART RATE: 102 BPM | OXYGEN SATURATION: 95 % | SYSTOLIC BLOOD PRESSURE: 140 MMHG | DIASTOLIC BLOOD PRESSURE: 78 MMHG | HEIGHT: 62 IN | WEIGHT: 147 LBS

## 2022-01-25 DIAGNOSIS — K21.9 GASTRO-ESOPHAGEAL REFLUX DISEASE W/OUT ESOPHAGITIS: ICD-10-CM

## 2022-01-25 DIAGNOSIS — A09 INFECTIOUS GASTROENTERITIS AND COLITIS, UNSPECIFIED: ICD-10-CM

## 2022-01-25 DIAGNOSIS — K51.90 ULCERATIVE COLITIS, UNSPECIFIED, W/OUT COMPLICATIONS: ICD-10-CM

## 2022-01-25 PROCEDURE — 99213 OFFICE O/P EST LOW 20 MIN: CPT

## 2022-01-25 RX ORDER — MESALAMINE 0.38 G/1
0.38 CAPSULE, EXTENDED RELEASE ORAL DAILY
Qty: 120 | Refills: 5 | Status: ACTIVE | COMMUNITY
Start: 2022-01-25 | End: 1900-01-01

## 2022-01-25 RX ORDER — MESALAMINE 4 G/60ML
4 ENEMA RECTAL
Qty: 2 | Refills: 5 | Status: DISCONTINUED | COMMUNITY
Start: 2021-09-07 | End: 2022-01-25

## 2022-01-25 RX ORDER — MESALAMINE 1000 MG/1
1000 SUPPOSITORY RECTAL
Qty: 1 | Refills: 0 | Status: DISCONTINUED | COMMUNITY
Start: 2021-08-02 | End: 2022-01-25

## 2022-01-25 RX ORDER — PREDNISONE 5 MG/1
5 TABLET ORAL DAILY
Qty: 42 | Refills: 0 | Status: DISCONTINUED | COMMUNITY
Start: 2020-10-26 | End: 2022-01-25

## 2022-01-25 RX ORDER — MESALAMINE 1.2 G/1
1.2 TABLET, DELAYED RELEASE ORAL
Qty: 360 | Refills: 2 | Status: DISCONTINUED | COMMUNITY
Start: 2020-11-05 | End: 2022-01-25

## 2022-01-25 RX ORDER — HYOSCYAMINE SULFATE 0.12 MG/1
0.12 TABLET ORAL
Qty: 360 | Refills: 0 | Status: DISCONTINUED | COMMUNITY
Start: 2022-01-13 | End: 2022-01-25

## 2022-01-25 RX ORDER — DICYCLOMINE HYDROCHLORIDE 10 MG/1
10 CAPSULE ORAL
Qty: 90 | Refills: 0 | Status: ACTIVE | COMMUNITY
Start: 2022-01-25 | End: 1900-01-01

## 2022-01-25 RX ORDER — PREDNISONE 10 MG/1
10 TABLET ORAL
Qty: 60 | Refills: 0 | Status: DISCONTINUED | COMMUNITY
Start: 2021-09-08 | End: 2022-01-25

## 2022-01-25 RX ORDER — AZITHROMYCIN 500 MG/1
500 TABLET, FILM COATED ORAL DAILY
Qty: 1 | Refills: 0 | Status: DISCONTINUED | COMMUNITY
Start: 2021-08-05 | End: 2022-01-25

## 2022-01-25 NOTE — PHYSICAL EXAM
[General Appearance - Alert] : alert [General Appearance - In No Acute Distress] : in no acute distress [General Appearance - Well Nourished] : well nourished [General Appearance - Well Developed] : well developed [Sclera] : the sclera and conjunctiva were normal [Neck Appearance] : the appearance of the neck was normal [] : no respiratory distress [Respiration, Rhythm And Depth] : normal respiratory rhythm and effort [Exaggerated Use Of Accessory Muscles For Inspiration] : no accessory muscle use [Auscultation Breath Sounds / Voice Sounds] : lungs were clear to auscultation bilaterally [Apical Impulse] : the apical impulse was normal [Heart Rate And Rhythm] : heart rate was normal and rhythm regular [Heart Sounds] : normal S1 and S2 [Edema] : there was no peripheral edema [Bowel Sounds] : normal bowel sounds [Abdomen Soft] : soft [Abdomen Tenderness] : non-tender [Abnormal Walk] : normal gait [Skin Color & Pigmentation] : normal skin color and pigmentation [Skin Turgor] : normal skin turgor [Oriented To Time, Place, And Person] : oriented to person, place, and time [Impaired Insight] : insight and judgment were intact [Affect] : the affect was normal [Mood] : the mood was normal

## 2022-01-26 ENCOUNTER — NON-APPOINTMENT (OUTPATIENT)
Age: 67
End: 2022-01-26

## 2022-01-27 NOTE — HISTORY OF PRESENT ILLNESS
[de-identified] : 66-year-old female,with ulcerative colitis diagnosed in 2020 August was an incidental finding with colonoscopy. She than developed severe colitis flare . She was treated with Prednisone taper stared Mesalamine suppository and Mesalamine oral dose.\par She had Uveitis in 2020 was treated with Prednisone eye drops . \par Patient was also treated with oral antibiotics for GI PCR confirmed E. coli in August 2021\par She was given Hyoscyamine for abdominal cramps early this month improved cramps but made her nauseas, she is checking If she can take Dicyclomine, new prescription sent to her pharmacy.\par \par Her colitis symptoms stable with 2-3  bowel movement daily. Denies diarrhea, hematochezia, or mucus in stool. She denies nausea, vomiting, constipation or weight loss.\par \par She got denial letter from her insurance for Lialda, it will only cover Apriso, New prescription for Apriso sent to her pharmacy. \par Acid reflex well controlled on Omeprazole daily. \par Last colonoscopy August 2020.\par  \par

## 2022-01-27 NOTE — ASSESSMENT
[FreeTextEntry1] : 66 year old female with ulcerative colitis complaint with mesalamine orally and her symptoms well controlled. \par Mesalamine prescription from [facility] changed to Apriso as per insurance coverage. \par Dr Sequeira came in answered all her questions, agree with the plan. \par Plan,\par Continue Apriso daily\par Continue Omeprazole \par  Will discuss and order colonoscopy and blood work on her next visit. \par F/U OV in 3-4 months earlier if needed.

## 2022-02-22 ENCOUNTER — APPOINTMENT (OUTPATIENT)
Dept: OPHTHALMOLOGY | Facility: CLINIC | Age: 67
End: 2022-02-22
Payer: MEDICARE

## 2022-02-22 ENCOUNTER — NON-APPOINTMENT (OUTPATIENT)
Age: 67
End: 2022-02-22

## 2022-02-22 PROCEDURE — 92012 INTRM OPH EXAM EST PATIENT: CPT

## 2022-02-22 PROCEDURE — 92025 CPTRIZED CORNEAL TOPOGRAPHY: CPT

## 2022-02-22 PROCEDURE — 92136 OPHTHALMIC BIOMETRY: CPT

## 2022-03-16 ENCOUNTER — NON-APPOINTMENT (OUTPATIENT)
Age: 67
End: 2022-03-16

## 2022-03-16 ENCOUNTER — APPOINTMENT (OUTPATIENT)
Dept: OPHTHALMOLOGY | Facility: CLINIC | Age: 67
End: 2022-03-16
Payer: MEDICARE

## 2022-03-16 PROCEDURE — 92014 COMPRE OPH EXAM EST PT 1/>: CPT

## 2022-05-10 RX ORDER — OMEPRAZOLE 40 MG/1
40 CAPSULE, DELAYED RELEASE ORAL
Qty: 30 | Refills: 5 | Status: ACTIVE | COMMUNITY
Start: 2020-09-22 | End: 1900-01-01

## 2022-05-11 ENCOUNTER — RX RENEWAL (OUTPATIENT)
Age: 67
End: 2022-05-11

## 2022-05-31 ENCOUNTER — RX RENEWAL (OUTPATIENT)
Age: 67
End: 2022-05-31

## 2022-06-06 ENCOUNTER — APPOINTMENT (OUTPATIENT)
Dept: OPHTHALMOLOGY | Facility: AMBULATORY SURGERY CENTER | Age: 67
End: 2022-06-06
Payer: MEDICARE

## 2022-06-06 PROCEDURE — 66982 XCAPSL CTRC RMVL CPLX WO ECP: CPT | Mod: LT

## 2022-06-07 ENCOUNTER — APPOINTMENT (OUTPATIENT)
Dept: OPHTHALMOLOGY | Facility: CLINIC | Age: 67
End: 2022-06-07
Payer: MEDICARE

## 2022-06-07 ENCOUNTER — NON-APPOINTMENT (OUTPATIENT)
Age: 67
End: 2022-06-07

## 2022-06-07 PROCEDURE — 99024 POSTOP FOLLOW-UP VISIT: CPT

## 2022-06-14 ENCOUNTER — NON-APPOINTMENT (OUTPATIENT)
Age: 67
End: 2022-06-14

## 2022-06-14 ENCOUNTER — APPOINTMENT (OUTPATIENT)
Dept: OPHTHALMOLOGY | Facility: CLINIC | Age: 67
End: 2022-06-14
Payer: MEDICARE

## 2022-06-14 PROCEDURE — 99024 POSTOP FOLLOW-UP VISIT: CPT

## 2022-06-28 ENCOUNTER — APPOINTMENT (OUTPATIENT)
Dept: GASTROENTEROLOGY | Facility: CLINIC | Age: 67
End: 2022-06-28

## 2022-07-06 ENCOUNTER — NON-APPOINTMENT (OUTPATIENT)
Age: 67
End: 2022-07-06

## 2022-07-06 ENCOUNTER — APPOINTMENT (OUTPATIENT)
Dept: OPHTHALMOLOGY | Facility: CLINIC | Age: 67
End: 2022-07-06

## 2022-07-06 PROCEDURE — 92134 CPTRZ OPH DX IMG PST SGM RTA: CPT

## 2022-07-06 PROCEDURE — 99024 POSTOP FOLLOW-UP VISIT: CPT

## 2022-08-11 ENCOUNTER — APPOINTMENT (OUTPATIENT)
Dept: GASTROENTEROLOGY | Facility: CLINIC | Age: 67
End: 2022-08-11

## 2022-10-19 ENCOUNTER — RX RENEWAL (OUTPATIENT)
Age: 67
End: 2022-10-19

## 2022-11-09 ENCOUNTER — APPOINTMENT (OUTPATIENT)
Dept: OPHTHALMOLOGY | Facility: CLINIC | Age: 67
End: 2022-11-09

## 2022-11-09 ENCOUNTER — NON-APPOINTMENT (OUTPATIENT)
Age: 67
End: 2022-11-09

## 2022-11-09 PROCEDURE — 92012 INTRM OPH EXAM EST PATIENT: CPT

## 2023-04-25 ENCOUNTER — APPOINTMENT (OUTPATIENT)
Dept: OPHTHALMOLOGY | Facility: CLINIC | Age: 68
End: 2023-04-25
Payer: MEDICARE

## 2023-04-25 ENCOUNTER — NON-APPOINTMENT (OUTPATIENT)
Age: 68
End: 2023-04-25

## 2023-04-25 PROCEDURE — 92134 CPTRZ OPH DX IMG PST SGM RTA: CPT

## 2023-04-25 PROCEDURE — 92012 INTRM OPH EXAM EST PATIENT: CPT

## 2023-06-15 ENCOUNTER — TELEPHONE ENCOUNTER (OUTPATIENT)
Dept: URBAN - METROPOLITAN AREA CLINIC 63 | Facility: CLINIC | Age: 68
End: 2023-06-15

## 2023-07-12 ENCOUNTER — WEB ENCOUNTER (OUTPATIENT)
Dept: URBAN - METROPOLITAN AREA CLINIC 80 | Facility: CLINIC | Age: 68
End: 2023-07-12

## 2023-07-12 ENCOUNTER — OFFICE VISIT (OUTPATIENT)
Dept: URBAN - METROPOLITAN AREA CLINIC 80 | Facility: CLINIC | Age: 68
End: 2023-07-12
Payer: COMMERCIAL

## 2023-07-12 ENCOUNTER — TELEPHONE ENCOUNTER (OUTPATIENT)
Dept: URBAN - METROPOLITAN AREA CLINIC 80 | Facility: CLINIC | Age: 68
End: 2023-07-12

## 2023-07-12 ENCOUNTER — LAB OUTSIDE AN ENCOUNTER (OUTPATIENT)
Dept: URBAN - METROPOLITAN AREA CLINIC 80 | Facility: CLINIC | Age: 68
End: 2023-07-12

## 2023-07-12 VITALS
TEMPERATURE: 97.8 F | BODY MASS INDEX: 24.29 KG/M2 | DIASTOLIC BLOOD PRESSURE: 76 MMHG | SYSTOLIC BLOOD PRESSURE: 124 MMHG | HEART RATE: 109 BPM | HEIGHT: 62 IN | WEIGHT: 132 LBS

## 2023-07-12 DIAGNOSIS — K51.018 ULCERATIVE PANCOLITIS WITH OTHER COMPLICATION: ICD-10-CM

## 2023-07-12 PROBLEM — 444548001: Status: ACTIVE | Noted: 2023-07-12

## 2023-07-12 PROCEDURE — 99204 OFFICE O/P NEW MOD 45 MIN: CPT | Performed by: INTERNAL MEDICINE

## 2023-07-12 RX ORDER — PREDNISONE 20 MG/1
1 TABLET TABLET ORAL ONCE A DAY
Qty: 30 | Refills: 1 | OUTPATIENT
Start: 2023-07-12 | End: 2023-09-10

## 2023-07-12 RX ORDER — MESALAMINE 375 MG/1
4 CAPSULES IN THE MORNING CAPSULE, EXTENDED RELEASE ORAL ONCE A DAY
Status: ACTIVE | COMMUNITY

## 2023-07-12 RX ORDER — HYDROCORTISONE 100 MG/60ML
60 ML IN THE EVENING SUSPENSION RECTAL
Status: ACTIVE | COMMUNITY

## 2023-07-12 RX ORDER — OMEPRAZOLE 20 MG/1
1 CAPSULE 30 MINUTES BEFORE MORNING MEAL CAPSULE, DELAYED RELEASE ORAL ONCE A DAY
Status: ACTIVE | COMMUNITY

## 2023-07-12 NOTE — HPI-TODAY'S VISIT:
She comes in today to establish care after moving from [place].  She has ulcerative colits and currently reports she is not feeling well in general. She is currently taking mesalamine and 2 different enemas daily. She had a colonoscopy in August notable for pancolitis.  She is currently having 4-5 bowel movements per day but she does not see any blood. She does note excess fatigue and low grade fevers.

## 2023-07-13 ENCOUNTER — WEB ENCOUNTER (OUTPATIENT)
Dept: URBAN - METROPOLITAN AREA CLINIC 80 | Facility: CLINIC | Age: 68
End: 2023-07-13

## 2023-07-14 LAB
A/G RATIO: 0.8
ABSOLUTE BASOPHILS: 26
ABSOLUTE EOSINOPHILS: 238
ABSOLUTE LYMPHOCYTES: 1254
ABSOLUTE MONOCYTES: 620
ABSOLUTE NEUTROPHILS: (no result)
ALBUMIN: 3.3
ALKALINE PHOSPHATASE: 105
ALT (SGPT): 7
AST (SGOT): 11
BASOPHILS: 0.2
BILIRUBIN, TOTAL: 0.3
BUN/CREATININE RATIO: (no result)
BUN: 7
C-REACTIVE PROTEIN, QUANT: 109.5
CALCIUM: 8.9
CARBON DIOXIDE, TOTAL: 27
CBC MORPHOLOGY: (no result)
CHLORIDE: 100
CREATININE: 0.76
EGFR: 86
EOSINOPHILS: 1.8
GLOBULIN, TOTAL: 4.4
GLUCOSE: 115
HEMATOCRIT: 35.5
HEMOGLOBIN: 9.9
LYMPHOCYTES: 9.5
MCH: 18
MCHC: 27.9
MCV: 64.5
MONOCYTES: 4.7
MPV: 10.2
NEUTROPHILS: 83.8
PLATELET COUNT: 420
POTASSIUM: 3.7
PROTEIN, TOTAL: 7.7
RDW: 19.7
RED BLOOD CELL COUNT: 5.5
SODIUM: 139
WHITE BLOOD CELL COUNT: 13.2

## 2023-07-27 ENCOUNTER — OFFICE VISIT (OUTPATIENT)
Dept: URBAN - METROPOLITAN AREA SURGERY CENTER 19 | Facility: SURGERY CENTER | Age: 68
End: 2023-07-27
Payer: COMMERCIAL

## 2023-07-27 ENCOUNTER — CLAIMS CREATED FROM THE CLAIM WINDOW (OUTPATIENT)
Dept: URBAN - METROPOLITAN AREA CLINIC 4 | Facility: CLINIC | Age: 68
End: 2023-07-27
Payer: COMMERCIAL

## 2023-07-27 DIAGNOSIS — K63.89 OTHER SPECIFIED DISEASES OF INTESTINE: ICD-10-CM

## 2023-07-27 DIAGNOSIS — K51.00 CHRONIC PANCOLONIC ULCERATIVE COLITIS: ICD-10-CM

## 2023-07-27 DIAGNOSIS — K51.919 ULCERATIVE COLITIS, UNSPECIFIED WITH UNSPECIFIED COMPLICATIONS: ICD-10-CM

## 2023-07-27 PROCEDURE — 45380 COLONOSCOPY AND BIOPSY: CPT | Performed by: INTERNAL MEDICINE

## 2023-07-27 PROCEDURE — 88305 TISSUE EXAM BY PATHOLOGIST: CPT | Performed by: PATHOLOGY

## 2023-07-27 PROCEDURE — G8907 PT DOC NO EVENTS ON DISCHARG: HCPCS | Performed by: INTERNAL MEDICINE

## 2023-07-27 PROCEDURE — 88342 IMHCHEM/IMCYTCHM 1ST ANTB: CPT | Performed by: PATHOLOGY

## 2023-07-27 RX ORDER — PREDNISONE 20 MG/1
1 TABLET TABLET ORAL ONCE A DAY
Qty: 30 | Refills: 1 | Status: ACTIVE | COMMUNITY
Start: 2023-07-12 | End: 2023-09-10

## 2023-07-27 RX ORDER — OMEPRAZOLE 20 MG/1
1 CAPSULE 30 MINUTES BEFORE MORNING MEAL CAPSULE, DELAYED RELEASE ORAL ONCE A DAY
Status: ACTIVE | COMMUNITY

## 2023-07-27 RX ORDER — HYDROCORTISONE 100 MG/60ML
60 ML IN THE EVENING SUSPENSION RECTAL
Status: ACTIVE | COMMUNITY

## 2023-07-27 RX ORDER — MESALAMINE 375 MG/1
4 CAPSULES IN THE MORNING CAPSULE, EXTENDED RELEASE ORAL ONCE A DAY
Status: ACTIVE | COMMUNITY

## 2023-07-31 ENCOUNTER — TELEPHONE ENCOUNTER (OUTPATIENT)
Dept: URBAN - METROPOLITAN AREA CLINIC 80 | Facility: CLINIC | Age: 68
End: 2023-07-31

## 2023-07-31 RX ORDER — PREDNISONE 20 MG/1
1 TABLET TABLET ORAL ONCE A DAY
Qty: 30 | Refills: 1 | OUTPATIENT
Start: 2023-07-31 | End: 2023-09-29

## 2023-08-25 ENCOUNTER — LAB OUTSIDE AN ENCOUNTER (OUTPATIENT)
Dept: URBAN - METROPOLITAN AREA CLINIC 80 | Facility: CLINIC | Age: 68
End: 2023-08-25

## 2023-08-25 ENCOUNTER — OFFICE VISIT (OUTPATIENT)
Dept: URBAN - METROPOLITAN AREA CLINIC 80 | Facility: CLINIC | Age: 68
End: 2023-08-25
Payer: COMMERCIAL

## 2023-08-25 VITALS
TEMPERATURE: 97.3 F | HEART RATE: 89 BPM | WEIGHT: 137.4 LBS | SYSTOLIC BLOOD PRESSURE: 154 MMHG | HEIGHT: 62 IN | DIASTOLIC BLOOD PRESSURE: 82 MMHG | BODY MASS INDEX: 25.28 KG/M2

## 2023-08-25 DIAGNOSIS — K51.018 ULCERATIVE PANCOLITIS WITH OTHER COMPLICATION: ICD-10-CM

## 2023-08-25 PROCEDURE — 99214 OFFICE O/P EST MOD 30 MIN: CPT | Performed by: INTERNAL MEDICINE

## 2023-08-25 RX ORDER — MESALAMINE 375 MG/1
4 CAPSULES IN THE MORNING CAPSULE, EXTENDED RELEASE ORAL ONCE A DAY
Qty: 120 | Refills: 3

## 2023-08-25 RX ORDER — OMEPRAZOLE 20 MG/1
1 CAPSULE 30 MINUTES BEFORE MORNING MEAL CAPSULE, DELAYED RELEASE ORAL ONCE A DAY
Status: ACTIVE | COMMUNITY

## 2023-08-25 RX ORDER — PREDNISONE 20 MG/1
1 TABLET TABLET ORAL ONCE A DAY
Qty: 30 | Refills: 1 | Status: ACTIVE | COMMUNITY
Start: 2023-07-31 | End: 2023-09-29

## 2023-08-25 RX ORDER — PREDNISONE 20 MG/1
1 TABLET TABLET ORAL ONCE A DAY
Qty: 30 | Refills: 1 | Status: ACTIVE | COMMUNITY
Start: 2023-07-12 | End: 2023-09-10

## 2023-08-25 RX ORDER — HYDROCORTISONE 100 MG/60ML
60 ML IN THE EVENING SUSPENSION RECTAL
Status: ACTIVE | COMMUNITY

## 2023-08-25 RX ORDER — MESALAMINE 375 MG/1
4 CAPSULES IN THE MORNING CAPSULE, EXTENDED RELEASE ORAL ONCE A DAY
Status: ACTIVE | COMMUNITY

## 2023-08-25 NOTE — HPI-TODAY'S VISIT:
She comes in today accompanied by family for interval follow up. Her recent colonoscopy was notable for moderate pancolitis. She feels she is doing better overall with the addition of prednisone. We discussed need to increase her therapy today in detail and discussed various treatment options. She does not like needles.

## 2023-08-30 ENCOUNTER — TELEPHONE ENCOUNTER (OUTPATIENT)
Dept: URBAN - METROPOLITAN AREA CLINIC 80 | Facility: CLINIC | Age: 68
End: 2023-08-30

## 2023-09-02 LAB
A/G RATIO: 1.1
ABSOLUTE BASOPHILS: 14
ABSOLUTE EOSINOPHILS: 14
ABSOLUTE LYMPHOCYTES: 986
ABSOLUTE MONOCYTES: 110
ABSOLUTE NEUTROPHILS: (no result)
ALBUMIN: 3.5
ALKALINE PHOSPHATASE: 66
ALT (SGPT): 11
AST (SGOT): 10
BASOPHILS: 0.1
BILIRUBIN, TOTAL: 0.4
BUN/CREATININE RATIO: (no result)
BUN: 10
C-REACTIVE PROTEIN, QUANT: 9
CALCIUM: 9
CARBON DIOXIDE, TOTAL: 29
CBC MORPHOLOGY: (no result)
CHLORIDE: 104
CHOL/HDLC RATIO: 2.7
CHOLESTEROL, TOTAL: 217
CREATININE: 0.83
EGFR: 77
EOSINOPHILS: 0.1
GLOBULIN, TOTAL: 3.3
GLUCOSE: 142
HBSAG SCREEN: (no result)
HDL CHOLESTEROL: 81
HEMATOCRIT: 37.7
HEMOGLOBIN: 10
HEP A AB, IGM: (no result)
HEP B CORE AB, IGM: (no result)
HEPATITIS C ANTIBODY: (no result)
LDL CHOLESTEROL CALC: 108
LYMPHOCYTES: 7.2
MCH: 17.5
MCHC: 26.5
MCV: 65.8
MITOGEN-NIL: 0.61
MONOCYTES: 0.8
MPV: 9.9
NEUTROPHILS: 91.8
NON HDL CHOLESTEROL: 136
PLATELET COUNT: 297
POTASSIUM: 3.3
PROTEIN, TOTAL: 6.8
QUANTIFERON NIL VALUE: 0.05
QUANTIFERON TB1 AG VALUE: 0
QUANTIFERON TB2 AG VALUE: <0
QUANTIFERON-TB GOLD PLUS: NEGATIVE
RDW: 19.4
RED BLOOD CELL COUNT: 5.73
SODIUM: 143
TRIGLYCERIDES: 164
WHITE BLOOD CELL COUNT: 13.7

## 2023-09-05 ENCOUNTER — TELEPHONE ENCOUNTER (OUTPATIENT)
Dept: URBAN - METROPOLITAN AREA CLINIC 80 | Facility: CLINIC | Age: 68
End: 2023-09-05

## 2023-09-05 ENCOUNTER — WEB ENCOUNTER (OUTPATIENT)
Dept: URBAN - METROPOLITAN AREA CLINIC 80 | Facility: CLINIC | Age: 68
End: 2023-09-05

## 2023-09-08 ENCOUNTER — TELEPHONE ENCOUNTER (OUTPATIENT)
Dept: URBAN - METROPOLITAN AREA CLINIC 80 | Facility: CLINIC | Age: 68
End: 2023-09-08

## 2023-09-08 RX ORDER — PREDNISONE 20 MG/1
1 TABLET TABLET ORAL ONCE A DAY
Qty: 30 | Refills: 1
Start: 2023-07-31 | End: 2023-11-07

## 2023-10-04 ENCOUNTER — TELEPHONE ENCOUNTER (OUTPATIENT)
Dept: URBAN - METROPOLITAN AREA CLINIC 80 | Facility: CLINIC | Age: 68
End: 2023-10-04

## 2023-10-04 RX ORDER — MESALAMINE 375 MG/1
4 CAPSULES IN THE MORNING CAPSULE, EXTENDED RELEASE ORAL ONCE A DAY
Qty: 120 | Refills: 3
End: 2024-02-01

## 2023-10-04 RX ORDER — PREDNISONE 20 MG/1
1 TABLET TABLET ORAL ONCE A DAY
Qty: 30 | Refills: 1
Start: 2023-07-31 | End: 2023-12-03

## 2023-12-01 ENCOUNTER — LAB OUTSIDE AN ENCOUNTER (OUTPATIENT)
Dept: URBAN - METROPOLITAN AREA CLINIC 80 | Facility: CLINIC | Age: 68
End: 2023-12-01

## 2023-12-01 ENCOUNTER — OFFICE VISIT (OUTPATIENT)
Dept: URBAN - METROPOLITAN AREA CLINIC 80 | Facility: CLINIC | Age: 68
End: 2023-12-01
Payer: COMMERCIAL

## 2023-12-01 VITALS
BODY MASS INDEX: 25.76 KG/M2 | WEIGHT: 140 LBS | SYSTOLIC BLOOD PRESSURE: 170 MMHG | TEMPERATURE: 98 F | DIASTOLIC BLOOD PRESSURE: 95 MMHG | HEART RATE: 95 BPM | HEIGHT: 62 IN

## 2023-12-01 DIAGNOSIS — K51.018 ULCERATIVE PANCOLITIS WITH OTHER COMPLICATION: ICD-10-CM

## 2023-12-01 PROCEDURE — 99214 OFFICE O/P EST MOD 30 MIN: CPT | Performed by: INTERNAL MEDICINE

## 2023-12-01 RX ORDER — MESALAMINE 375 MG/1
4 CAPSULES IN THE MORNING CAPSULE, EXTENDED RELEASE ORAL ONCE A DAY
Qty: 120 | Refills: 3 | Status: ACTIVE | COMMUNITY
End: 2024-02-01

## 2023-12-01 RX ORDER — DICYCLOMINE HYDROCHLORIDE 10 MG/1
1 CAPSULE 30 MINUTES BEFORE EATING CAPSULE ORAL THREE TIMES A DAY
Qty: 60 | Refills: 5 | OUTPATIENT
Start: 2023-12-01 | End: 2024-05-29

## 2023-12-01 RX ORDER — PREDNISONE 20 MG/1
1 TABLET TABLET ORAL ONCE A DAY
Qty: 30 | Refills: 1 | Status: ACTIVE | COMMUNITY
Start: 2023-07-31 | End: 2023-12-03

## 2023-12-01 RX ORDER — OMEPRAZOLE 20 MG/1
1 CAPSULE 30 MINUTES BEFORE MORNING MEAL CAPSULE, DELAYED RELEASE ORAL ONCE A DAY
Status: ACTIVE | COMMUNITY

## 2023-12-01 RX ORDER — HYDROCORTISONE 100 MG/60ML
60 ML IN THE EVENING SUSPENSION RECTAL
Status: ACTIVE | COMMUNITY

## 2023-12-01 NOTE — HPI-TODAY'S VISIT:
She comes in today accompanied by family.  She has been on rinvoq for 45 days.  She has noted some improvement in her stool output she continues to feel some cramping and will attempt to go to the bathroom but will pass very little stool. She recently tried dicyclomine and that seemed to help. She has noted some arm tingling in her left arm. She is currently taking rinvoq and 4 mesalamine per day.

## 2023-12-05 LAB
A/G RATIO: 1.2
ABSOLUTE BASOPHILS: 29
ABSOLUTE EOSINOPHILS: 143
ABSOLUTE LYMPHOCYTES: 2388
ABSOLUTE MONOCYTES: 686
ABSOLUTE NEUTROPHILS: (no result)
ALBUMIN: 3.7
ALKALINE PHOSPHATASE: 87
ALT (SGPT): 9
AST (SGOT): 13
BASOPHILS: 0.2
BILIRUBIN, TOTAL: 0.4
BUN/CREATININE RATIO: (no result)
BUN: 8
C-REACTIVE PROTEIN, QUANT: 37.6
CALCIUM: 9.3
CARBON DIOXIDE, TOTAL: 29
CBC MORPHOLOGY: (no result)
CHLORIDE: 109
CREATININE: 0.75
EGFR: 87
EOSINOPHILS: 1
GLOBULIN, TOTAL: 3
GLUCOSE: 102
HEMATOCRIT: 33.9
HEMOGLOBIN: 9.8
LYMPHOCYTES: 16.7
MCH: 19.2
MCHC: 28.9
MCV: 66.3
MONOCYTES: 4.8
MPV: 10.6
NEUTROPHILS: 77.3
PLATELET COUNT: 472
POTASSIUM: 3.9
PROTEIN, TOTAL: 6.7
RDW: 22.8
RED BLOOD CELL COUNT: 5.11
SODIUM: 147
WHITE BLOOD CELL COUNT: 14.3

## 2024-02-16 ENCOUNTER — LAB (OUTPATIENT)
Dept: URBAN - METROPOLITAN AREA CLINIC 80 | Facility: CLINIC | Age: 69
End: 2024-02-16

## 2024-02-16 ENCOUNTER — OV EP (OUTPATIENT)
Dept: URBAN - METROPOLITAN AREA CLINIC 80 | Facility: CLINIC | Age: 69
End: 2024-02-16
Payer: COMMERCIAL

## 2024-02-16 VITALS
HEART RATE: 95 BPM | TEMPERATURE: 97.6 F | BODY MASS INDEX: 24.77 KG/M2 | SYSTOLIC BLOOD PRESSURE: 148 MMHG | WEIGHT: 134.6 LBS | HEIGHT: 62 IN | DIASTOLIC BLOOD PRESSURE: 95 MMHG

## 2024-02-16 DIAGNOSIS — K51.018 ULCERATIVE PANCOLITIS WITH OTHER COMPLICATION: ICD-10-CM

## 2024-02-16 PROCEDURE — 99214 OFFICE O/P EST MOD 30 MIN: CPT | Performed by: INTERNAL MEDICINE

## 2024-02-16 RX ORDER — DICYCLOMINE HYDROCHLORIDE 10 MG/1
1 CAPSULE 30 MINUTES BEFORE EATING CAPSULE ORAL THREE TIMES A DAY
Qty: 60 | Refills: 5 | Status: ACTIVE | COMMUNITY
Start: 2023-12-01 | End: 2024-05-29

## 2024-02-16 RX ORDER — HYDROCORTISONE 100 MG/60ML
60 ML IN THE EVENING SUSPENSION RECTAL
Status: ACTIVE | COMMUNITY

## 2024-02-16 RX ORDER — OMEPRAZOLE 20 MG/1
1 CAPSULE 30 MINUTES BEFORE MORNING MEAL CAPSULE, DELAYED RELEASE ORAL ONCE A DAY
Status: ACTIVE | COMMUNITY

## 2024-02-16 NOTE — HPI-TODAY'S VISIT:
She comes in today accompanied by family to discuss her symptoms.  Unfortunately she does not feel like rinvoq is working.  She tends to wake up numerous times at night to have a bowel movement.  The quantity of stool is small.  She denies bleeding. She tends to have minimal symptoms during the day. She can have episodes of diarrhea and urgency which will follow random meals, especially if she eats at a restuarant.  Her CRP was quite elevated in December.

## 2024-02-22 LAB — C-REACTIVE PROTEIN, QUANT: 137.3

## 2024-02-29 ENCOUNTER — FLEX SIG (OUTPATIENT)
Dept: URBAN - METROPOLITAN AREA SURGERY CENTER 19 | Facility: SURGERY CENTER | Age: 69
End: 2024-02-29

## 2024-04-12 ENCOUNTER — STI (OUTPATIENT)
Dept: URBAN - METROPOLITAN AREA CLINIC 79 | Facility: CLINIC | Age: 69
End: 2024-04-12

## 2024-04-19 ENCOUNTER — STI (OUTPATIENT)
Dept: URBAN - METROPOLITAN AREA CLINIC 18 | Facility: CLINIC | Age: 69
End: 2024-04-19
Payer: COMMERCIAL

## 2024-04-19 VITALS
BODY MASS INDEX: 25.58 KG/M2 | WEIGHT: 139 LBS | SYSTOLIC BLOOD PRESSURE: 146 MMHG | DIASTOLIC BLOOD PRESSURE: 99 MMHG | HEART RATE: 101 BPM | RESPIRATION RATE: 18 BRPM | HEIGHT: 62 IN | TEMPERATURE: 98 F

## 2024-04-19 DIAGNOSIS — K51.80 OTHER ULCERATIVE COLITIS: ICD-10-CM

## 2024-04-19 PROCEDURE — 96413 CHEMO IV INFUSION 1 HR: CPT | Performed by: INTERNAL MEDICINE

## 2024-04-19 RX ORDER — OMEPRAZOLE 20 MG/1
1 CAPSULE 30 MINUTES BEFORE MORNING MEAL CAPSULE, DELAYED RELEASE ORAL ONCE A DAY
Status: ACTIVE | COMMUNITY

## 2024-04-19 RX ORDER — DICYCLOMINE HYDROCHLORIDE 10 MG/1
1 CAPSULE 30 MINUTES BEFORE EATING CAPSULE ORAL THREE TIMES A DAY
Qty: 60 | Refills: 5 | Status: ACTIVE | COMMUNITY
Start: 2023-12-01 | End: 2024-05-29

## 2024-04-19 RX ORDER — HYDROCORTISONE 100 MG/60ML
60 ML IN THE EVENING SUSPENSION RECTAL
Status: ACTIVE | COMMUNITY

## 2024-04-19 RX ORDER — PREDNISONE 20 MG/1
1 TABLET TABLET ORAL ONCE A DAY
Qty: 30 | Refills: 1 | Status: ACTIVE | COMMUNITY
Start: 2024-03-01 | End: 2024-04-30

## 2024-05-07 ENCOUNTER — TELEPHONE ENCOUNTER (OUTPATIENT)
Dept: URBAN - METROPOLITAN AREA CLINIC 80 | Facility: CLINIC | Age: 69
End: 2024-05-07

## 2024-06-14 ENCOUNTER — LAB OUTSIDE AN ENCOUNTER (OUTPATIENT)
Dept: URBAN - METROPOLITAN AREA CLINIC 80 | Facility: CLINIC | Age: 69
End: 2024-06-14

## 2024-06-14 ENCOUNTER — OFFICE VISIT (OUTPATIENT)
Dept: URBAN - METROPOLITAN AREA CLINIC 80 | Facility: CLINIC | Age: 69
End: 2024-06-14
Payer: COMMERCIAL

## 2024-06-14 ENCOUNTER — DASHBOARD ENCOUNTERS (OUTPATIENT)
Age: 69
End: 2024-06-14

## 2024-06-14 VITALS
HEIGHT: 62 IN | BODY MASS INDEX: 26.02 KG/M2 | HEART RATE: 87 BPM | TEMPERATURE: 97.7 F | DIASTOLIC BLOOD PRESSURE: 87 MMHG | WEIGHT: 141.4 LBS | SYSTOLIC BLOOD PRESSURE: 152 MMHG

## 2024-06-14 DIAGNOSIS — K51.018 ULCERATIVE PANCOLITIS WITH OTHER COMPLICATION: ICD-10-CM

## 2024-06-14 PROCEDURE — 99214 OFFICE O/P EST MOD 30 MIN: CPT | Performed by: INTERNAL MEDICINE

## 2024-06-14 RX ORDER — HYDROCORTISONE 100 MG/60ML
60 ML IN THE EVENING SUSPENSION RECTAL
Status: ACTIVE | COMMUNITY

## 2024-06-14 RX ORDER — OMEPRAZOLE 20 MG/1
1 CAPSULE 30 MINUTES BEFORE MORNING MEAL CAPSULE, DELAYED RELEASE ORAL ONCE A DAY
Status: ACTIVE | COMMUNITY

## 2024-06-14 NOTE — HPI-TODAY'S VISIT:
She comes in today for interval follow up. She had her first stelar infusion several weeks ago.  After that she has noted complete resolution of her symptoms. She is now sleeping through the night and having 1-2 formed stools per day. She having no issues with pain or bleeding. She has gained 10 pounds.

## 2024-06-19 LAB
A/G RATIO: 1.1
ABSOLUTE BASOPHILS: 7
ABSOLUTE EOSINOPHILS: 69
ABSOLUTE LYMPHOCYTES: 1152
ABSOLUTE MONOCYTES: 469
ABSOLUTE NEUTROPHILS: 5203
ALBUMIN: 3.7
ALKALINE PHOSPHATASE: 90
ALT (SGPT): 9
AST (SGOT): 14
BASOPHILS: 0.1
BILIRUBIN, TOTAL: 0.3
BUN/CREATININE RATIO: (no result)
BUN: 7
C-REACTIVE PROTEIN, QUANT: 33.2
CALCIUM: 9.1
CARBON DIOXIDE, TOTAL: 25
CBC MORPHOLOGY: (no result)
CHLORIDE: 107
CREATININE: 0.73
EGFR: 90
EOSINOPHILS: 1
GLOBULIN, TOTAL: 3.3
GLUCOSE: 111
HEMATOCRIT: 33.1
HEMOGLOBIN: 9.3
LYMPHOCYTES: 16.7
MCH: 18.2
MCHC: 28.1
MCV: 64.6
MONOCYTES: 6.8
MPV: 10.3
NEUTROPHILS: 75.4
PLATELET COUNT: 308
POTASSIUM: 4.6
PROTEIN, TOTAL: 7
RDW: 21.5
RED BLOOD CELL COUNT: 5.12
SODIUM: 141
WHITE BLOOD CELL COUNT: 6.9

## 2025-01-17 ENCOUNTER — OFFICE VISIT (OUTPATIENT)
Dept: URBAN - METROPOLITAN AREA CLINIC 80 | Facility: CLINIC | Age: 70
End: 2025-01-17
Payer: MEDICARE

## 2025-01-17 ENCOUNTER — LAB OUTSIDE AN ENCOUNTER (OUTPATIENT)
Dept: URBAN - METROPOLITAN AREA CLINIC 80 | Facility: CLINIC | Age: 70
End: 2025-01-17

## 2025-01-17 VITALS
TEMPERATURE: 98 F | SYSTOLIC BLOOD PRESSURE: 132 MMHG | BODY MASS INDEX: 26.94 KG/M2 | HEIGHT: 62 IN | DIASTOLIC BLOOD PRESSURE: 80 MMHG | HEART RATE: 111 BPM | WEIGHT: 146.4 LBS

## 2025-01-17 DIAGNOSIS — K21.9 GASTROESOPHAGEAL REFLUX DISEASE, UNSPECIFIED WHETHER ESOPHAGITIS PRESENT: ICD-10-CM

## 2025-01-17 DIAGNOSIS — K51.018 ULCERATIVE PANCOLITIS WITH OTHER COMPLICATION: ICD-10-CM

## 2025-01-17 PROBLEM — 235595009: Status: ACTIVE | Noted: 2025-01-17

## 2025-01-17 PROCEDURE — 99214 OFFICE O/P EST MOD 30 MIN: CPT | Performed by: INTERNAL MEDICINE

## 2025-01-17 RX ORDER — OMEPRAZOLE 20 MG/1
1 CAPSULE 30 MINUTES BEFORE MORNING MEAL CAPSULE, DELAYED RELEASE ORAL ONCE A DAY
Status: ACTIVE | COMMUNITY

## 2025-01-17 RX ORDER — PANTOPRAZOLE SODIUM 40 MG/1
1 TABLET TABLET, DELAYED RELEASE ORAL ONCE A DAY
Qty: 30 TABLET | Refills: 2 | OUTPATIENT
Start: 2025-01-17

## 2025-01-17 RX ORDER — HYDROCORTISONE 100 MG/60ML
60 ML IN THE EVENING SUSPENSION RECTAL
Status: ACTIVE | COMMUNITY

## 2025-01-17 NOTE — HPI-TODAY'S VISIT:
She comes in today for interval follow up.  Overall she is doing well and feels that stelara is working.  She tends to have  1-2 bowel movements per day.  She denies bleeding. She recently has noted an increase in reflux which tends to be most notable every day around 4pm.  She tried omeprazole OTC with minimal response. She does have osteoporosis and is going to start treatment.  She has episodes of urgent loose bowel movements which tend to occur when she eats out at restaurant.

## 2025-01-22 LAB
A/G RATIO: 1.2
ABSOLUTE BASOPHILS: 19
ABSOLUTE EOSINOPHILS: 204
ABSOLUTE LYMPHOCYTES: 1969
ABSOLUTE MONOCYTES: 475
ABSOLUTE NEUTROPHILS: 7033
ALBUMIN: 4
ALKALINE PHOSPHATASE: 121
ALT (SGPT): 15
AST (SGOT): 18
BASOPHILS: 0.2
BILIRUBIN, TOTAL: 0.5
BUN/CREATININE RATIO: (no result)
BUN: 10
C-REACTIVE PROTEIN, QUANT: 5
CALCIUM: 9.4
CARBON DIOXIDE, TOTAL: 27
CBC MORPHOLOGY: (no result)
CHLORIDE: 108
CREATININE: 0.7
EGFR: 94
EOSINOPHILS: 2.1
GLOBULIN, TOTAL: 3.4
GLUCOSE: 119
HBSAG SCREEN: (no result)
HEMATOCRIT: 41.3
HEMOGLOBIN: 10.8
HEP A AB, IGM: (no result)
HEP B CORE AB, IGM: (no result)
HEPATITIS C ANTIBODY: (no result)
LYMPHOCYTES: 20.3
MCH: 16.7
MCHC: 26.2
MCV: 64
MITOGEN-NIL: 1.48
MONOCYTES: 4.9
MPV: (no result)
NEUTROPHILS: 72.5
PLATELET COUNT: 356
POTASSIUM: 4.3
PROTEIN, TOTAL: 7.4
QUANTIFERON NIL VALUE: 0.03
QUANTIFERON TB1 AG VALUE: <0
QUANTIFERON TB2 AG VALUE: <0
QUANTIFERON-TB GOLD PLUS: NEGATIVE
RDW: 24.1
RED BLOOD CELL COUNT: 6.45
SODIUM: 145
WHITE BLOOD CELL COUNT: 9.7

## 2025-05-01 ENCOUNTER — ERX REFILL RESPONSE (OUTPATIENT)
Dept: URBAN - METROPOLITAN AREA CLINIC 80 | Facility: CLINIC | Age: 70
End: 2025-05-01

## 2025-05-01 RX ORDER — PANTOPRAZOLE SODIUM 40 MG/1
TAKE 1 TABLET BY MOUTH DAILY TABLET, DELAYED RELEASE ORAL
Qty: 90 TABLET | Refills: 0 | OUTPATIENT

## 2025-05-01 RX ORDER — PANTOPRAZOLE SODIUM 40 MG/1
1 TABLET TABLET, DELAYED RELEASE ORAL ONCE A DAY
Qty: 30 TABLET | Refills: 2 | OUTPATIENT

## 2025-06-16 ENCOUNTER — TELEPHONE ENCOUNTER (OUTPATIENT)
Dept: URBAN - METROPOLITAN AREA CLINIC 80 | Facility: CLINIC | Age: 70
End: 2025-06-16

## 2025-07-31 ENCOUNTER — ERX REFILL RESPONSE (OUTPATIENT)
Dept: URBAN - METROPOLITAN AREA CLINIC 80 | Facility: CLINIC | Age: 70
End: 2025-07-31

## 2025-07-31 RX ORDER — PANTOPRAZOLE SODIUM 40 MG/1
TAKE 1 TABLET BY MOUTH DAILY TABLET, DELAYED RELEASE ORAL
Qty: 90 TABLET | Refills: 0 | OUTPATIENT